# Patient Record
Sex: MALE | URBAN - METROPOLITAN AREA
[De-identification: names, ages, dates, MRNs, and addresses within clinical notes are randomized per-mention and may not be internally consistent; named-entity substitution may affect disease eponyms.]

---

## 2023-06-12 ENCOUNTER — APPOINTMENT (OUTPATIENT)
Age: 33
Setting detail: DERMATOLOGY
End: 2023-06-12

## 2023-06-12 DIAGNOSIS — L63.8 OTHER ALOPECIA AREATA: ICD-10-CM

## 2023-06-12 PROCEDURE — OTHER FOLLOW UP FOR NEXT VISIT: OTHER

## 2023-06-12 PROCEDURE — OTHER COUNSELING: OTHER

## 2023-06-12 PROCEDURE — OTHER INTRALESIONAL KENALOG: OTHER

## 2023-06-12 PROCEDURE — OTHER TREATMENT REGIMEN: OTHER

## 2023-06-12 PROCEDURE — 11901 INJECT SKIN LESIONS >7: CPT

## 2023-06-12 ASSESSMENT — LOCATION DETAILED DESCRIPTION DERM
LOCATION DETAILED: RIGHT INFERIOR CENTRAL BUCCAL CHEEK
LOCATION DETAILED: SUBMENTAL CHIN
LOCATION DETAILED: LEFT SUBMANDIBULAR AREA
LOCATION DETAILED: LEFT LOWER CUTANEOUS LIP
LOCATION DETAILED: LEFT LATERAL MANDIBULAR CHEEK
LOCATION DETAILED: LEFT MEDIAL MANDIBULAR CHEEK
LOCATION DETAILED: LEFT CENTRAL SUBMANDIBULAR CHEEK
LOCATION DETAILED: RIGHT LATERAL SUBMANDIBULAR CHEEK
LOCATION DETAILED: RIGHT CENTRAL BUCCAL CHEEK
LOCATION DETAILED: LEFT MEDIAL SUBMANDIBULAR CHEEK
LOCATION DETAILED: RIGHT CHIN
LOCATION DETAILED: RIGHT CENTRAL SUBMANDIBULAR CHEEK
LOCATION DETAILED: RIGHT LOWER CUTANEOUS LIP
LOCATION DETAILED: LEFT CENTRAL MANDIBULAR CHEEK
LOCATION DETAILED: RIGHT LATERAL BUCCAL CHEEK
LOCATION DETAILED: LEFT CHIN

## 2023-06-12 ASSESSMENT — LOCATION SIMPLE DESCRIPTION DERM
LOCATION SIMPLE: CHIN
LOCATION SIMPLE: LEFT SUBMANDIBULAR AREA
LOCATION SIMPLE: RIGHT LIP
LOCATION SIMPLE: SUBMENTAL CHIN
LOCATION SIMPLE: LEFT LIP
LOCATION SIMPLE: RIGHT CHEEK
LOCATION SIMPLE: LEFT CHEEK

## 2023-06-12 ASSESSMENT — SEVERITY OF ALOPECIA TOOL: % SCALP HAIR LOST: 5

## 2023-06-12 ASSESSMENT — LOCATION ZONE DERM
LOCATION ZONE: LIP
LOCATION ZONE: FACE

## 2023-06-12 ASSESSMENT — SEVERITY ASSESSMENT OVERALL AMONG ALL PATIENTS
IN YOUR EXPERIENCE, AMONG ALL PATIENTS YOU HAVE SEEN WITH THIS CONDITION, HOW SEVERE IS THIS PATIENT'S CONDITION?: S1 (LESS THAN 25% HAIR LOSS)

## 2023-06-12 NOTE — PROCEDURE: INTRALESIONAL KENALOG
How Many Mls Were Removed From The 40 Mg/Ml (1ml) Vial When Preparing The Injectable Solution?: 0
Ndc# For Kenalog Only: [address]-42
Total Volume (Ccs): 3.0
Medical Necessity Clause: This procedure was medically necessary because the lesions that were treated were: enlarging, and not improving
Bill For Wasted Drug (Kenalog)?: no
Concentration Of Kenalog Solution Injected (Mg/Ml): 10.0
Treatment Number (Optional): 1
Administered By (Optional): Adam Heart MD
Detail Level: Detailed
Show Inventory Tab: Hide
Consent: The risks of atrophy were reviewed with the patient.
Validate Note Data When Using Inventory: Yes
Lot # For Kenalog (Optional): [id number]
Kenalog Preparation: Kenalog with normal saline

## 2023-06-12 NOTE — PROCEDURE: TREATMENT REGIMEN
Continue Regimen: Minoxidil
Initiate Treatment: ILK 10 injections
Detail Level: Simple
Plan: After discussing topical versus injectable options, patient did elect to proceed with intralesional kenalog. Patient will follow up in 4 to 6 weeks. If repeat treatment is needed. He will still continue using the derma roller with the topical Minoxidil starting again in approximately one week. Call her follow up if he has any problems.

## 2023-06-12 NOTE — HPI: HAIR LOSS
How Did The Hair Loss Occur?: sudden in onset
How Severe Is Your Hair Loss?: moderate
Additional History: Patient presents with a concern for hair loss in his beard. He is interested in Kenalog injections but also seeking evaluation and management.  Patient did use a derma roller along with topical Minoxidil and has had some recovery but he’s interested in treatment options to help promote further recovery.